# Patient Record
Sex: MALE | URBAN - METROPOLITAN AREA
[De-identification: names, ages, dates, MRNs, and addresses within clinical notes are randomized per-mention and may not be internally consistent; named-entity substitution may affect disease eponyms.]

---

## 2023-04-26 ENCOUNTER — TELEPHONE ENCOUNTER (OUTPATIENT)
Dept: URBAN - METROPOLITAN AREA CLINIC 113 | Facility: CLINIC | Age: 53
End: 2023-04-26

## 2023-05-05 ENCOUNTER — TELEPHONE ENCOUNTER (OUTPATIENT)
Dept: URBAN - METROPOLITAN AREA CLINIC 113 | Facility: CLINIC | Age: 53
End: 2023-05-05

## 2023-06-26 ENCOUNTER — WEB ENCOUNTER (OUTPATIENT)
Dept: URBAN - METROPOLITAN AREA CLINIC 113 | Facility: CLINIC | Age: 53
End: 2023-06-26

## 2023-06-27 ENCOUNTER — OFFICE VISIT (OUTPATIENT)
Dept: URBAN - METROPOLITAN AREA CLINIC 113 | Facility: CLINIC | Age: 53
End: 2023-06-27
Payer: COMMERCIAL

## 2023-06-27 ENCOUNTER — DASHBOARD ENCOUNTERS (OUTPATIENT)
Age: 53
End: 2023-06-27

## 2023-06-27 VITALS
HEART RATE: 89 BPM | HEIGHT: 70 IN | BODY MASS INDEX: 24.2 KG/M2 | RESPIRATION RATE: 18 BRPM | DIASTOLIC BLOOD PRESSURE: 81 MMHG | SYSTOLIC BLOOD PRESSURE: 117 MMHG | TEMPERATURE: 97.2 F | WEIGHT: 169 LBS

## 2023-06-27 DIAGNOSIS — R53.83 FATIGUE, UNSPECIFIED TYPE: ICD-10-CM

## 2023-06-27 PROCEDURE — 99203 OFFICE O/P NEW LOW 30 MIN: CPT | Performed by: STUDENT IN AN ORGANIZED HEALTH CARE EDUCATION/TRAINING PROGRAM

## 2023-06-27 RX ORDER — LEVOTHYROXINE SODIUM 175 UG/1
1 TABLET IN THE MORNING ON AN EMPTY STOMACH TABLET ORAL ONCE A DAY
Status: ACTIVE | COMMUNITY

## 2023-06-27 RX ORDER — ALPRAZOLAM 0.5 MG/1
1 TABLET TABLET ORAL TWICE A DAY
Status: ACTIVE | COMMUNITY

## 2023-06-27 NOTE — HPI-TODAY'S VISIT:
Initial visit 6/27/2023; PCP Dr. Amaury Rose Mr. Meza is a 52-year-old male with a past medical history of hypothyroidism, hyperlipidemia, generalized anxiety disorder who was referred to the GI clinic for evaluation of anemia. 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 Patient states that his main complaint is fatigue mainly over the past few days. He denies any overt bleeding, we discussed that his labs from 03/2023 show a normal hemoglobin so he is no longer anemia. He states he had a colonoscopy performed last year in [place] and had polyps removed, he was recommended a 2 year colonoscopy follow up. We will attempt to get these records to confirm. He admits to "brain fog", itchy eyes and congestion. No family history of colon cancer. No weight loss.

## 2023-06-27 NOTE — PHYSICAL EXAM GASTROINTESTINAL
Abdomen , soft, nontender, nondistended , no guarding or rigidity , small lipoma palpated on the LUQ , normal bowel sounds , Liver and Spleen , no hepatomegaly present , no hepatosplenomegaly , liver nontender , spleen not palpable , Rectal , normal sphincter tone , no internal hemorrhoids, rectal masses or bleeding present

## 2023-06-28 LAB
ABSOLUTE BASOPHILS: 23
ABSOLUTE EOSINOPHILS: 51
ABSOLUTE LYMPHOCYTES: 1425
ABSOLUTE MONOCYTES: 371
ABSOLUTE NEUTROPHILS: 3830
BASOPHILS: 0.4
EOSINOPHILS: 0.9
FERRITIN, SERUM: 66
HEMATOCRIT: 39.8
HEMOGLOBIN: 13.7
IRON BIND.CAP.(TIBC): 300
IRON SATURATION: 38
IRON: 114
LYMPHOCYTES: 25
MCH: 32
MCHC: 34.4
MCV: 93
MONOCYTES: 6.5
MPV: 9.3
NEUTROPHILS: 67.2
PLATELET COUNT: 194
RDW: 12.7
RED BLOOD CELL COUNT: 4.28
WHITE BLOOD CELL COUNT: 5.7